# Patient Record
Sex: FEMALE | HISPANIC OR LATINO
[De-identification: names, ages, dates, MRNs, and addresses within clinical notes are randomized per-mention and may not be internally consistent; named-entity substitution may affect disease eponyms.]

---

## 2017-08-09 ENCOUNTER — TRANSCRIPTION ENCOUNTER (OUTPATIENT)
Age: 32
End: 2017-08-09

## 2022-09-15 PROBLEM — Z00.00 ENCOUNTER FOR PREVENTIVE HEALTH EXAMINATION: Status: ACTIVE | Noted: 2022-09-15

## 2022-10-10 ENCOUNTER — LABORATORY RESULT (OUTPATIENT)
Age: 37
End: 2022-10-10

## 2022-10-10 ENCOUNTER — APPOINTMENT (OUTPATIENT)
Dept: OBGYN | Facility: CLINIC | Age: 37
End: 2022-10-10

## 2022-10-10 VITALS
WEIGHT: 148 LBS | HEIGHT: 65 IN | SYSTOLIC BLOOD PRESSURE: 115 MMHG | BODY MASS INDEX: 24.66 KG/M2 | DIASTOLIC BLOOD PRESSURE: 75 MMHG

## 2022-10-10 PROCEDURE — 99385 PREV VISIT NEW AGE 18-39: CPT

## 2022-10-10 PROCEDURE — 99202 OFFICE O/P NEW SF 15 MIN: CPT | Mod: 25

## 2022-10-10 NOTE — COUNSELING
[Preconception Care/ Fertility options] : preconception care, fertility options [Vaccines] : vaccines

## 2022-10-12 ENCOUNTER — LABORATORY RESULT (OUTPATIENT)
Age: 37
End: 2022-10-12

## 2022-10-27 ENCOUNTER — APPOINTMENT (OUTPATIENT)
Dept: OBGYN | Facility: CLINIC | Age: 37
End: 2022-10-27

## 2022-10-27 ENCOUNTER — TRANSCRIPTION ENCOUNTER (OUTPATIENT)
Age: 37
End: 2022-10-27

## 2022-10-27 PROCEDURE — 99213 OFFICE O/P EST LOW 20 MIN: CPT | Mod: 95

## 2022-11-03 NOTE — HISTORY OF PRESENT ILLNESS
[Home] : at home, [unfilled] , at the time of the visit. [Other Location: e.g. Home (Enter Location, City,State)___] : at [unfilled] [Verbal consent obtained from patient] : the patient, [unfilled] [FreeTextEntry1] : pt called discussed the findings of the herpes as well as the hepatitis c . She doesn't recall having been infected or vaccinated with hepatitis c . I discussed a referral to the GI specialist for evaluation and management .

## 2022-11-09 ENCOUNTER — APPOINTMENT (OUTPATIENT)
Dept: GASTROENTEROLOGY | Facility: CLINIC | Age: 37
End: 2022-11-09

## 2022-11-09 ENCOUNTER — LABORATORY RESULT (OUTPATIENT)
Age: 37
End: 2022-11-09

## 2022-11-09 VITALS
BODY MASS INDEX: 24.16 KG/M2 | OXYGEN SATURATION: 100 % | DIASTOLIC BLOOD PRESSURE: 53 MMHG | HEART RATE: 88 BPM | HEIGHT: 65 IN | TEMPERATURE: 95.3 F | SYSTOLIC BLOOD PRESSURE: 126 MMHG | WEIGHT: 145 LBS

## 2022-11-09 DIAGNOSIS — D64.9 ANEMIA, UNSPECIFIED: ICD-10-CM

## 2022-11-09 DIAGNOSIS — Z87.11 PERSONAL HISTORY OF PEPTIC ULCER DISEASE: ICD-10-CM

## 2022-11-09 DIAGNOSIS — Z78.9 OTHER SPECIFIED HEALTH STATUS: ICD-10-CM

## 2022-11-09 DIAGNOSIS — Z86.39 PERSONAL HISTORY OF OTHER ENDOCRINE, NUTRITIONAL AND METABOLIC DISEASE: ICD-10-CM

## 2022-11-09 PROCEDURE — 99204 OFFICE O/P NEW MOD 45 MIN: CPT

## 2022-11-09 RX ORDER — SPIRONOLACTONE 25 MG/1
25 TABLET ORAL
Qty: 30 | Refills: 0 | Status: ACTIVE | COMMUNITY
Start: 2022-07-15

## 2022-11-09 RX ORDER — SPIRONOLACTONE 50 MG/1
50 TABLET ORAL
Qty: 30 | Refills: 0 | Status: ACTIVE | COMMUNITY
Start: 2022-05-18

## 2022-11-09 RX ORDER — METRONIDAZOLE 7.5 MG/G
0.75 CREAM TOPICAL
Qty: 45 | Refills: 0 | Status: ACTIVE | COMMUNITY
Start: 2022-08-11

## 2022-11-09 RX ORDER — TRIAMCINOLONE ACETONIDE 1 MG/G
0.1 OINTMENT TOPICAL
Qty: 45 | Refills: 0 | Status: ACTIVE | COMMUNITY
Start: 2022-06-16

## 2022-11-09 RX ORDER — CLASCOTERONE 1 G/100G
1 CREAM TOPICAL
Qty: 60 | Refills: 0 | Status: ACTIVE | COMMUNITY
Start: 2022-11-04

## 2022-11-09 RX ORDER — TRETINOIN 1 MG/G
0.1 CREAM TOPICAL
Qty: 45 | Refills: 0 | Status: ACTIVE | COMMUNITY
Start: 2022-09-18

## 2022-11-09 RX ORDER — HYDROCORTISONE 1 %
12 CREAM (GRAM) TOPICAL
Qty: 225 | Refills: 0 | Status: ACTIVE | COMMUNITY
Start: 2022-08-12

## 2022-11-09 NOTE — ASSESSMENT
[FreeTextEntry1] : Diarrhea: The patient complains of diarrhea.  I recommend a low residue diet. The patient is to avoid fiber supplementation. The patient is to consider starting a trial of a probiotic such as Align once a day.   If the symptoms persist, the patient may require sending stool studies for C+S, O+P x3, and C. difficile to assess for an infectious etiology of the diarrhea.  The symptoms are worse after meals.   I recommend a trial of cholestyramine one packet once a day for possible bile induced diarrhea. If the symptoms persist, the patient may require a colonoscopy to assess for colitis versus other causes.  The patient was told of the risks and benefits of the procedure.  The patient was told of the risks of perforation, emergency surgery, bleeding, infections and missed lesions.  The patient agreed and will follow-up to reassess the symptoms.  \par Prior Endoscopic Procedures: The patient had a prior upper endoscopy and colonoscopy performed by another gastroenterologist.  I will try to obtain the prior upper endoscopy and colonoscopy reports.  The patient is to sign a record release to obtain those records.\par Anemia: The patient admits to a history of anemia on prior blood work.  The patient had a prior upper endoscopy and colonoscopy performed to assess the anemia.  I will try to obtain the entire reports of the upper endoscopy and colonoscopy.  I recommend repeat blood work to assess the anemia.  The patient agrees and will follow up.\par Hepatitis C Exposure: The blood work performed on October 12, 2022 revealed a nonreactive HIV combo antigen/antibody screening test, a nonreactive hepatitis B surface antibody, a weakly reactive hepatitis C antibody, a nondetected hepatitis C PCR RNA qualitative.  The abdominal ultrasound performed on August 10, 2022 revealed heterogeneity of the hepatic echotexture which may be due to fatty infiltration or hepatocellular disease otherwise no gross abnormalities identified.  The patient was found to have hepatitis C antibody positive.  Hepatitis C PCR RNA qualitative was not detected.  The abdominal ultrasound was unremarkable.  It is unclear whether the patient has a false positive hepatitis C antibody versus prior exposure.  The patient and I had a long discussion regarding the findings of the blood work and the possibilities of a false positive versus prior exposure.  The patient was told of the potential increased risk of liver cancer with prior exposure.  The patient was advised to followup in the office every 6 months to one year for repeat blood work and imaging studies to assess the liver.  The patient was advised to avoid alcohol and hepatotoxic agents.  The patient agrees and will follow-up.\par Blood Work: I recommend blood work to assess the liver. I recommend a CBC, SMA 24, amylase, lipase, ESR, TFTs, MARILYNN, rheumatoid factor, celiac sprue panel, IgA, profile for hepatitis A, B, C. ,AFP, alpha 1 anti-trypsin  antibody, ceruloplasmin level, iron, TIBC, ferritin level, AMA, anti smooth muscle antibody and PT/INR/PTT.  I also recommend obtaining the recent blood work performed by the patient's PMD.\par Follow-up: The patient is to follow-up in the office in 4 weeks to reassess the symptoms. The patient was told to call the office if any further problems. \par

## 2022-11-09 NOTE — REVIEW OF SYSTEMS
[Feeling Tired] : feeling tired [Diarrhea] : diarrhea [Easy Bruising] : a tendency for easy bruising [Negative] : Heme/Lymph [FreeTextEntry3] : blurred vision [de-identified] : rash

## 2022-11-09 NOTE — HISTORY OF PRESENT ILLNESS
[de-identified] : The upper endoscopy performed by Dr. Hugh Kaufman on August 21, 2021 revealed a medium sized hiatal hernia, large amount of bile in the stomach.  \par \par  [FreeTextEntry1] : The colonoscopy performed by Dr. Hugh Kaufman on August 25, 2021 revealed an inflammatory polyp in the cecum.\par  [de-identified] : The abdominal ultrasound performed on August 10, 2022 revealed heterogeneity of the hepatic echotexture which may be due to fatty infiltration or hepatocellular disease otherwise no gross abnormalities identified.  \par \par  [de-identified] : The fluoroscopic esophagram barium swallow performed on November 3, 2021 revealed an unremarkable esophagram.  \par

## 2022-11-10 ENCOUNTER — NON-APPOINTMENT (OUTPATIENT)
Age: 37
End: 2022-11-10

## 2022-11-10 LAB
A1AT SERPL-MCNC: 139 MG/DL
AFP-TM SERPL-MCNC: 2.7 NG/ML
ALBUMIN SERPL ELPH-MCNC: 4.6 G/DL
ALP BLD-CCNC: 47 U/L
ALT SERPL-CCNC: 11 U/L
ANION GAP SERPL CALC-SCNC: 15 MMOL/L
APTT BLD: 38.3 SEC
AST SERPL-CCNC: 20 U/L
BASOPHILS # BLD AUTO: 0.05 K/UL
BASOPHILS NFR BLD AUTO: 1.7 %
BILIRUB SERPL-MCNC: 0.3 MG/DL
BUN SERPL-MCNC: 8 MG/DL
CALCIUM SERPL-MCNC: 9.8 MG/DL
CHLORIDE SERPL-SCNC: 101 MMOL/L
CHOLEST SERPL-MCNC: 263 MG/DL
CO2 SERPL-SCNC: 22 MMOL/L
CREAT SERPL-MCNC: 0.49 MG/DL
EGFR: 127 ML/MIN/1.73M2
EOSINOPHIL # BLD AUTO: 0.08 K/UL
EOSINOPHIL NFR BLD AUTO: 2.6 %
ERYTHROCYTE [SEDIMENTATION RATE] IN BLOOD BY WESTERGREN METHOD: 21 MM/HR
FERRITIN SERPL-MCNC: 14 NG/ML
GGT SERPL-CCNC: 22 U/L
GLIADIN IGA SER QL: <5 UNITS
GLIADIN IGG SER QL: <5 UNITS
GLIADIN PEPTIDE IGA SER-ACNC: NEGATIVE
GLIADIN PEPTIDE IGG SER-ACNC: NEGATIVE
GLUCOSE SERPL-MCNC: 94 MG/DL
HBV CORE IGG+IGM SER QL: NONREACTIVE
HBV CORE IGM SER QL: NONREACTIVE
HBV E AB SER QL: NONREACTIVE
HBV E AG SER QL: NONREACTIVE
HBV SURFACE AB SER QL: NONREACTIVE
HBV SURFACE AG SER QL: NONREACTIVE
HCT VFR BLD CALC: 39.6 %
HCV AB SER QL: ABNORMAL
HCV RNA SERPL NAA+PROBE-LOG IU: NOT DETECTED LOGIU/ML
HCV S/CO RATIO: 1.04 S/CO
HDLC SERPL-MCNC: 77 MG/DL
HEPATITIS A IGG ANTIBODY: NONREACTIVE
HEPC RNA INTERP: NOT DETECTED
HGB BLD-MCNC: 12.7 G/DL
IGA SER QL IEP: 132 MG/DL
INR PPP: 1.14 RATIO
IRON SATN MFR SERPL: 8 %
IRON SERPL-MCNC: 33 UG/DL
LDLC SERPL CALC-MCNC: 178 MG/DL
LPL SERPL-CCNC: 20 U/L
LYMPHOCYTES # BLD AUTO: 1.69 K/UL
LYMPHOCYTES NFR BLD AUTO: 58.6 %
MAN DIFF?: NORMAL
MCHC RBC-ENTMCNC: 28.7 PG
MCHC RBC-ENTMCNC: 32.1 GM/DL
MCV RBC AUTO: 89.4 FL
MITOCHONDRIA AB SER IF-ACNC: NORMAL
MONOCYTES # BLD AUTO: 0.2 K/UL
MONOCYTES NFR BLD AUTO: 6.9 %
NEUTROPHILS # BLD AUTO: 0.77 K/UL
NEUTROPHILS NFR BLD AUTO: 25.9 %
NONHDLC SERPL-MCNC: 187 MG/DL
PLATELET # BLD AUTO: 313 K/UL
POTASSIUM SERPL-SCNC: 4.4 MMOL/L
PROT SERPL-MCNC: 8.1 G/DL
PT BLD: 13.2 SEC
RBC # BLD: 4.43 M/UL
RBC # FLD: 14.8 %
RHEUMATOID FACT SER QL: 10 IU/ML
SMOOTH MUSCLE AB SER QL IF: NORMAL
SODIUM SERPL-SCNC: 138 MMOL/L
TIBC SERPL-MCNC: 409 UG/DL
TRIGL SERPL-MCNC: 46 MG/DL
TSH SERPL-ACNC: 1.43 UIU/ML
TTG IGA SER IA-ACNC: <1.2 U/ML
TTG IGA SER-ACNC: NEGATIVE
TTG IGG SER IA-ACNC: 1.3 U/ML
TTG IGG SER IA-ACNC: NEGATIVE
UIBC SERPL-MCNC: 376 UG/DL
WBC # FLD AUTO: 2.89 K/UL

## 2022-11-14 ENCOUNTER — NON-APPOINTMENT (OUTPATIENT)
Age: 37
End: 2022-11-14

## 2022-11-14 LAB
ANA PAT FLD IF-IMP: ABNORMAL
ANA SER IF-ACNC: ABNORMAL

## 2022-11-15 LAB — HCV GENTYP BLD NAA+PROBE: NOT DETECTED

## 2022-12-14 NOTE — HISTORY OF PRESENT ILLNESS
[FreeTextEntry1] : Pt comes because she has fibroids . She has no pains , her periods are 3-5 days no bleeding in between her cycles or post coital bleeding . She is going for iui or ivf and sent for possible surgery . She has HSG showing open tubes nothing in the uterus . Ultrasound with three fibroids the largest 3 cm .

## 2023-01-11 ENCOUNTER — APPOINTMENT (OUTPATIENT)
Dept: GASTROENTEROLOGY | Facility: CLINIC | Age: 38
End: 2023-01-11
Payer: MEDICAID

## 2023-01-11 ENCOUNTER — LABORATORY RESULT (OUTPATIENT)
Age: 38
End: 2023-01-11

## 2023-01-11 VITALS
HEART RATE: 79 BPM | OXYGEN SATURATION: 99 % | HEIGHT: 65 IN | BODY MASS INDEX: 24.83 KG/M2 | SYSTOLIC BLOOD PRESSURE: 112 MMHG | DIASTOLIC BLOOD PRESSURE: 73 MMHG | WEIGHT: 149 LBS

## 2023-01-11 DIAGNOSIS — R19.7 DIARRHEA, UNSPECIFIED: ICD-10-CM

## 2023-01-11 DIAGNOSIS — D72.819 DECREASED WHITE BLOOD CELL COUNT, UNSPECIFIED: ICD-10-CM

## 2023-01-11 DIAGNOSIS — Z20.5 CONTACT WITH AND (SUSPECTED) EXPOSURE TO VIRAL HEPATITIS: ICD-10-CM

## 2023-01-11 DIAGNOSIS — R10.13 EPIGASTRIC PAIN: ICD-10-CM

## 2023-01-11 DIAGNOSIS — R10.84 GENERALIZED ABDOMINAL PAIN: ICD-10-CM

## 2023-01-11 PROCEDURE — 99214 OFFICE O/P EST MOD 30 MIN: CPT

## 2023-01-11 RX ORDER — SIMETHICONE 180 MG
180 CAPSULE ORAL 4 TIMES DAILY
Qty: 120 | Refills: 3 | Status: ACTIVE | COMMUNITY
Start: 2023-01-11 | End: 1900-01-01

## 2023-01-11 RX ORDER — DICYCLOMINE HYDROCHLORIDE 10 MG/1
10 CAPSULE ORAL
Qty: 270 | Refills: 3 | Status: ACTIVE | COMMUNITY
Start: 2023-01-11 | End: 1900-01-01

## 2023-01-11 NOTE — HISTORY OF PRESENT ILLNESS
[de-identified] : The upper endoscopy performed by Dr. Hugh Kaufman on August 21, 2021 revealed a medium sized hiatal hernia, large amount of bile in the stomach.  \par \par  [FreeTextEntry1] : The colonoscopy performed by Dr. Hugh Kaufman on August 25, 2021 revealed an inflammatory polyp in the cecum.\par  [de-identified] : The abdominal ultrasound performed on August 10, 2022 revealed heterogeneity of the hepatic echotexture which may be due to fatty infiltration or hepatocellular disease otherwise no gross abnormalities identified.  \par \par  [de-identified] : The fluoroscopic esophagram barium swallow performed on November 3, 2021 revealed an unremarkable esophagram.  \par

## 2023-01-11 NOTE — ASSESSMENT
[FreeTextEntry1] : Abdominal Pain: The patient complains of abdominal pain. The patient is to avoid nonsteroidal anti-inflammatory drugs and aspirin.  I recommend a low FOD-MAP diet.  I recommend a trial of Dicyclomine 10 mg tablet PO 3 times a day PRN for the abdominal pain.\par Dyspepsia: The patient complains of dyspeptic symptoms.  The patient was advised to continue to abide by an anti-gas (low FOD-MAP) diet.  The patient was previously given a pamphlet for anti-gas (low FOD-MAP).  The patient and I reviewed the anti-gas (low FOD-MAP)diet at length again. The patient is to continue on a trial of Simethicone one tablet 4 times a day p.r.n. abdominal pain and gas.\par Diarrhea: The patient complains of diarrhea. I recommend a low residue diet. The patient is to avoid fiber supplementation. The patient is to consider starting a trial of a probiotic such as Align once a day. If the symptoms persist, the patient may require sending stool studies for C+S, O+P x3, and C. difficile to assess for an infectious etiology of the diarrhea. The symptoms are worse after meals. I recommend a trial of cholestyramine one packet once a day for possible bile induced diarrhea. If the symptoms persist, the patient may require a colonoscopy to assess for colitis versus other causes. The patient was told of the risks and benefits of the procedure. The patient was told of the risks of perforation, emergency surgery, bleeding, infections and missed lesions. The patient agreed and will follow-up to reassess the symptoms. \par Prior Endoscopic Procedures: The patient had a prior upper endoscopy and colonoscopy performed by another gastroenterologist. I will try to obtain the prior upper endoscopy and colonoscopy reports. The patient is to sign a record release to obtain those records.\par Low WBC Count: The patient had a low WBC count on prior blood work.  I will repeat the CBC to assess the WBC count.\par Anemia: The patient admits to a history of anemia on prior blood work. The patient had a prior upper endoscopy and colonoscopy performed to assess the anemia. I will try to obtain the entire reports of the upper endoscopy and colonoscopy. I recommend repeat blood work to assess the anemia. The patient agrees and will follow up.\par Hepatitis C Exposure: The blood work performed on October 12, 2022 revealed a nonreactive HIV combo antigen/antibody screening test, a nonreactive hepatitis B surface antibody, a weakly reactive hepatitis C antibody, a nondetected hepatitis C PCR RNA qualitative. The abdominal ultrasound performed on August 10, 2022 revealed heterogeneity of the hepatic echotexture which may be due to fatty infiltration or hepatocellular disease otherwise no gross abnormalities identified. The patient was found to have hepatitis C antibody positive. Hepatitis C PCR RNA qualitative was not detected. The abdominal ultrasound was unremarkable. It is unclear whether the patient has a false positive hepatitis C antibody versus prior exposure. The patient and I had a long discussion regarding the findings of the blood work and the possibilities of a false positive versus prior exposure. The patient was told of the potential increased risk of liver cancer with prior exposure. The patient was advised to followup in the office every 6 months to one year for repeat blood work and imaging studies to assess the liver. The patient was advised to avoid alcohol and hepatotoxic agents. The patient agrees and will follow-up.\par Blood Work: I recommend blood work to assess the patient's symptoms. I recommend a CBC, SMA 24, amylase, lipase, ESR, a hepatitis  C. PCR qualitative. \par Follow-up: The patient is to follow-up in the office in 6 months to reassess the symptoms. The patient was told to call the office if any further problems. \par \par

## 2023-01-12 ENCOUNTER — NON-APPOINTMENT (OUTPATIENT)
Age: 38
End: 2023-01-12

## 2023-01-12 LAB
ALBUMIN SERPL ELPH-MCNC: 4.2 G/DL
ALP BLD-CCNC: 42 U/L
ALT SERPL-CCNC: 13 U/L
ANION GAP SERPL CALC-SCNC: 10 MMOL/L
AST SERPL-CCNC: 19 U/L
BASOPHILS # BLD AUTO: 0.05 K/UL
BASOPHILS NFR BLD AUTO: 1.1 %
BILIRUB SERPL-MCNC: 0.2 MG/DL
BUN SERPL-MCNC: 11 MG/DL
CALCIUM SERPL-MCNC: 9.2 MG/DL
CHLORIDE SERPL-SCNC: 104 MMOL/L
CO2 SERPL-SCNC: 24 MMOL/L
CREAT SERPL-MCNC: 0.49 MG/DL
EGFR: 126 ML/MIN/1.73M2
EOSINOPHIL # BLD AUTO: 0.09 K/UL
EOSINOPHIL NFR BLD AUTO: 2 %
ERYTHROCYTE [SEDIMENTATION RATE] IN BLOOD BY WESTERGREN METHOD: 16 MM/HR
GGT SERPL-CCNC: 18 U/L
GLUCOSE SERPL-MCNC: 93 MG/DL
HCT VFR BLD CALC: 37.9 %
HCV AB SER QL: ABNORMAL
HCV S/CO RATIO: 1.07 S/CO
HGB BLD-MCNC: 11.8 G/DL
IMM GRANULOCYTES NFR BLD AUTO: 0.2 %
IRON SATN MFR SERPL: 5 %
IRON SERPL-MCNC: 20 UG/DL
LYMPHOCYTES # BLD AUTO: 1.68 K/UL
LYMPHOCYTES NFR BLD AUTO: 37.3 %
MAN DIFF?: NORMAL
MCHC RBC-ENTMCNC: 27.8 PG
MCHC RBC-ENTMCNC: 31.1 GM/DL
MCV RBC AUTO: 89.2 FL
MONOCYTES # BLD AUTO: 0.43 K/UL
MONOCYTES NFR BLD AUTO: 9.6 %
NEUTROPHILS # BLD AUTO: 2.24 K/UL
NEUTROPHILS NFR BLD AUTO: 49.8 %
PLATELET # BLD AUTO: 321 K/UL
POTASSIUM SERPL-SCNC: 4 MMOL/L
PROT SERPL-MCNC: 7.1 G/DL
RBC # BLD: 4.25 M/UL
RBC # FLD: 14.6 %
SODIUM SERPL-SCNC: 138 MMOL/L
TIBC SERPL-MCNC: 420 UG/DL
UIBC SERPL-MCNC: 401 UG/DL
WBC # FLD AUTO: 4.5 K/UL

## 2023-01-30 ENCOUNTER — LABORATORY RESULT (OUTPATIENT)
Age: 38
End: 2023-01-30

## 2023-01-30 ENCOUNTER — APPOINTMENT (OUTPATIENT)
Dept: OBGYN | Facility: CLINIC | Age: 38
End: 2023-01-30
Payer: MEDICAID

## 2023-01-30 VITALS — WEIGHT: 148 LBS | DIASTOLIC BLOOD PRESSURE: 77 MMHG | BODY MASS INDEX: 24.63 KG/M2 | SYSTOLIC BLOOD PRESSURE: 118 MMHG

## 2023-01-30 DIAGNOSIS — N76.2 ACUTE VULVITIS: ICD-10-CM

## 2023-01-30 PROCEDURE — 99213 OFFICE O/P EST LOW 20 MIN: CPT

## 2023-01-30 RX ORDER — CLOTRIMAZOLE AND BETAMETHASONE DIPROPIONATE 10; .5 MG/ML; MG/ML
1-0.05 LOTION TOPICAL TWICE DAILY
Qty: 1 | Refills: 3 | Status: ACTIVE | COMMUNITY
Start: 2023-01-30 | End: 1900-01-01

## 2023-01-30 NOTE — PHYSICAL EXAM
[Vulvitis] : vulvitis [Labia Majora] : normal [Labia Minora] : normal [Normal] : normal [Enlarged ___ wks] : enlarged [unfilled] ~Uweeks

## 2023-02-02 ENCOUNTER — ASOB RESULT (OUTPATIENT)
Age: 38
End: 2023-02-02

## 2023-02-02 ENCOUNTER — APPOINTMENT (OUTPATIENT)
Dept: OBGYN | Facility: CLINIC | Age: 38
End: 2023-02-02
Payer: MEDICAID

## 2023-02-02 PROCEDURE — 76830 TRANSVAGINAL US NON-OB: CPT

## 2023-02-16 ENCOUNTER — APPOINTMENT (OUTPATIENT)
Dept: OBGYN | Facility: CLINIC | Age: 38
End: 2023-02-16
Payer: MEDICAID

## 2023-02-16 DIAGNOSIS — R10.2 PELVIC AND PERINEAL PAIN: ICD-10-CM

## 2023-02-16 DIAGNOSIS — D25.9 LEIOMYOMA OF UTERUS, UNSPECIFIED: ICD-10-CM

## 2023-02-16 DIAGNOSIS — G89.29 PELVIC AND PERINEAL PAIN: ICD-10-CM

## 2023-02-16 PROCEDURE — 99213 OFFICE O/P EST LOW 20 MIN: CPT | Mod: 95

## 2023-02-16 RX ORDER — CLOBETASOL PROPIONATE 0.5 MG/G
0.05 OINTMENT TOPICAL DAILY
Qty: 1 | Refills: 2 | Status: ACTIVE | COMMUNITY
Start: 2023-02-16 | End: 1900-01-01

## 2023-02-16 RX ORDER — ETODOLAC 500 MG/1
500 TABLET, FILM COATED, EXTENDED RELEASE ORAL DAILY
Qty: 1 | Refills: 3 | Status: ACTIVE | COMMUNITY
Start: 2023-02-16 | End: 1900-01-01

## 2023-02-22 PROBLEM — D25.9 FIBROID UTERUS: Status: ACTIVE | Noted: 2022-10-10

## 2023-02-22 NOTE — HISTORY OF PRESENT ILLNESS
[Home] : at home, [unfilled] , at the time of the visit. [Other Location: e.g. Home (Enter Location, City,State)___] : at [unfilled] [Verbal consent obtained from patient] : the patient, [unfilled] [FreeTextEntry1] : discussed the results of the ultrasound and a way to manage her dysmenorrhea . I suggested lodine as an alternative or even ocp . She would rather take the lodine .

## 2023-07-12 ENCOUNTER — APPOINTMENT (OUTPATIENT)
Dept: GASTROENTEROLOGY | Facility: CLINIC | Age: 38
End: 2023-07-12

## 2024-03-04 ENCOUNTER — APPOINTMENT (OUTPATIENT)
Dept: OBGYN | Facility: CLINIC | Age: 39
End: 2024-03-04

## 2024-04-09 ENCOUNTER — LABORATORY RESULT (OUTPATIENT)
Age: 39
End: 2024-04-09

## 2024-04-09 ENCOUNTER — APPOINTMENT (OUTPATIENT)
Dept: OBGYN | Facility: CLINIC | Age: 39
End: 2024-04-09
Payer: MEDICAID

## 2024-04-09 VITALS — DIASTOLIC BLOOD PRESSURE: 79 MMHG | BODY MASS INDEX: 24.46 KG/M2 | SYSTOLIC BLOOD PRESSURE: 123 MMHG | WEIGHT: 147 LBS

## 2024-04-09 DIAGNOSIS — Z01.419 ENCOUNTER FOR GYNECOLOGICAL EXAMINATION (GENERAL) (ROUTINE) W/OUT ABNORMAL FINDINGS: ICD-10-CM

## 2024-04-09 DIAGNOSIS — Z31.69 ENCOUNTER FOR OTHER GENERAL COUNSELING AND ADVICE ON PROCREATION: ICD-10-CM

## 2024-04-09 DIAGNOSIS — N63.12 UNSPECIFIED LUMP IN THE RIGHT BREAST, UPPER INNER QUADRANT: ICD-10-CM

## 2024-04-09 PROCEDURE — 99395 PREV VISIT EST AGE 18-39: CPT

## 2024-04-10 ENCOUNTER — LABORATORY RESULT (OUTPATIENT)
Age: 39
End: 2024-04-10

## 2024-04-10 PROBLEM — N63.12 MASS OF UPPER INNER QUADRANT OF RIGHT BREAST: Status: ACTIVE | Noted: 2024-04-10

## 2024-04-10 PROBLEM — Z31.69 ENCOUNTER FOR PRECONCEPTION CONSULTATION: Status: ACTIVE | Noted: 2024-04-10

## 2024-04-10 PROBLEM — Z01.419 WELL WOMAN EXAM WITH ROUTINE GYNECOLOGICAL EXAM: Status: ACTIVE | Noted: 2022-10-10

## 2024-04-10 NOTE — PHYSICAL EXAM
[Examination Of The Breasts] : a normal appearance [___cm] : a ~M [unfilled] ~Ucm superior medial quadrant mass was palpated [Labia Majora] : normal [Labia Minora] : normal [Normal] : normal [Uterine Adnexae] : normal

## 2024-04-11 ENCOUNTER — APPOINTMENT (OUTPATIENT)
Dept: ULTRASOUND IMAGING | Facility: CLINIC | Age: 39
End: 2024-04-11
Payer: MEDICAID

## 2024-04-11 ENCOUNTER — RESULT REVIEW (OUTPATIENT)
Age: 39
End: 2024-04-11

## 2024-04-11 PROCEDURE — 76830 TRANSVAGINAL US NON-OB: CPT
